# Patient Record
Sex: FEMALE | Race: OTHER | HISPANIC OR LATINO | URBAN - METROPOLITAN AREA
[De-identification: names, ages, dates, MRNs, and addresses within clinical notes are randomized per-mention and may not be internally consistent; named-entity substitution may affect disease eponyms.]

---

## 2017-04-17 ENCOUNTER — EMERGENCY (EMERGENCY)
Facility: HOSPITAL | Age: 57
LOS: 1 days | Discharge: HOME | End: 2017-04-17
Attending: EMERGENCY MEDICINE
Payer: COMMERCIAL

## 2017-04-17 VITALS
HEART RATE: 93 BPM | RESPIRATION RATE: 20 BRPM | TEMPERATURE: 98 F | HEIGHT: 62 IN | SYSTOLIC BLOOD PRESSURE: 128 MMHG | OXYGEN SATURATION: 95 % | WEIGHT: 205.03 LBS | DIASTOLIC BLOOD PRESSURE: 76 MMHG

## 2017-04-17 VITALS
RESPIRATION RATE: 20 BRPM | DIASTOLIC BLOOD PRESSURE: 76 MMHG | OXYGEN SATURATION: 98 % | SYSTOLIC BLOOD PRESSURE: 115 MMHG | HEART RATE: 85 BPM | TEMPERATURE: 98 F

## 2017-04-17 DIAGNOSIS — K57.92 DIVERTICULITIS OF INTESTINE, PART UNSPECIFIED, WITHOUT PERFORATION OR ABSCESS WITHOUT BLEEDING: ICD-10-CM

## 2017-04-17 LAB
ALBUMIN SERPL ELPH-MCNC: 3.2 G/DL — LOW (ref 3.5–5)
ALP SERPL-CCNC: 103 U/L — SIGNIFICANT CHANGE UP (ref 40–120)
ALT FLD-CCNC: 29 U/L DA — SIGNIFICANT CHANGE UP (ref 10–60)
ANION GAP SERPL CALC-SCNC: 7 MMOL/L — SIGNIFICANT CHANGE UP (ref 5–17)
APPEARANCE UR: CLEAR — SIGNIFICANT CHANGE UP
AST SERPL-CCNC: 15 U/L — SIGNIFICANT CHANGE UP (ref 10–40)
BASOPHILS # BLD AUTO: 0.2 K/UL — SIGNIFICANT CHANGE UP (ref 0–0.2)
BASOPHILS NFR BLD AUTO: 1.3 % — SIGNIFICANT CHANGE UP (ref 0–2)
BILIRUB SERPL-MCNC: 0.5 MG/DL — SIGNIFICANT CHANGE UP (ref 0.2–1.2)
BILIRUB UR-MCNC: NEGATIVE — SIGNIFICANT CHANGE UP
BUN SERPL-MCNC: 9 MG/DL — SIGNIFICANT CHANGE UP (ref 7–18)
CALCIUM SERPL-MCNC: 8.3 MG/DL — LOW (ref 8.4–10.5)
CHLORIDE SERPL-SCNC: 108 MMOL/L — SIGNIFICANT CHANGE UP (ref 96–108)
CO2 SERPL-SCNC: 30 MMOL/L — SIGNIFICANT CHANGE UP (ref 22–31)
COLOR SPEC: YELLOW — SIGNIFICANT CHANGE UP
CREAT SERPL-MCNC: 0.64 MG/DL — SIGNIFICANT CHANGE UP (ref 0.5–1.3)
DIFF PNL FLD: ABNORMAL
EOSINOPHIL # BLD AUTO: 0.6 K/UL — HIGH (ref 0–0.5)
EOSINOPHIL NFR BLD AUTO: 4.6 % — SIGNIFICANT CHANGE UP (ref 0–6)
GLUCOSE SERPL-MCNC: 115 MG/DL — HIGH (ref 70–99)
GLUCOSE UR QL: NEGATIVE — SIGNIFICANT CHANGE UP
HCG UR QL: NEGATIVE — SIGNIFICANT CHANGE UP
HCT VFR BLD CALC: 39.8 % — SIGNIFICANT CHANGE UP (ref 34.5–45)
HGB BLD-MCNC: 13.6 G/DL — SIGNIFICANT CHANGE UP (ref 11.5–15.5)
KETONES UR-MCNC: NEGATIVE — SIGNIFICANT CHANGE UP
LEUKOCYTE ESTERASE UR-ACNC: ABNORMAL
LIDOCAIN IGE QN: 80 U/L — SIGNIFICANT CHANGE UP (ref 73–393)
LYMPHOCYTES # BLD AUTO: 19.4 % — SIGNIFICANT CHANGE UP (ref 13–44)
LYMPHOCYTES # BLD AUTO: 2.4 K/UL — SIGNIFICANT CHANGE UP (ref 1–3.3)
MCHC RBC-ENTMCNC: 27.1 PG — SIGNIFICANT CHANGE UP (ref 27–34)
MCHC RBC-ENTMCNC: 34.1 GM/DL — SIGNIFICANT CHANGE UP (ref 32–36)
MCV RBC AUTO: 79.4 FL — LOW (ref 80–100)
MONOCYTES # BLD AUTO: 0.9 K/UL — SIGNIFICANT CHANGE UP (ref 0–0.9)
MONOCYTES NFR BLD AUTO: 7.1 % — SIGNIFICANT CHANGE UP (ref 2–14)
NEUTROPHILS # BLD AUTO: 8.2 K/UL — HIGH (ref 1.8–7.4)
NEUTROPHILS NFR BLD AUTO: 67.7 % — SIGNIFICANT CHANGE UP (ref 43–77)
NITRITE UR-MCNC: NEGATIVE — SIGNIFICANT CHANGE UP
PH UR: 6 — SIGNIFICANT CHANGE UP (ref 4.8–8)
PLATELET # BLD AUTO: 209 K/UL — SIGNIFICANT CHANGE UP (ref 150–400)
POTASSIUM SERPL-MCNC: 4 MMOL/L — SIGNIFICANT CHANGE UP (ref 3.5–5.3)
POTASSIUM SERPL-SCNC: 4 MMOL/L — SIGNIFICANT CHANGE UP (ref 3.5–5.3)
PROT SERPL-MCNC: 7.3 G/DL — SIGNIFICANT CHANGE UP (ref 6–8.3)
PROT UR-MCNC: NEGATIVE — SIGNIFICANT CHANGE UP
RBC # BLD: 5.01 M/UL — SIGNIFICANT CHANGE UP (ref 3.8–5.2)
RBC # FLD: 13.5 % — SIGNIFICANT CHANGE UP (ref 10.3–14.5)
SODIUM SERPL-SCNC: 145 MMOL/L — SIGNIFICANT CHANGE UP (ref 135–145)
SP GR SPEC: 1 — LOW (ref 1.01–1.02)
UROBILINOGEN FLD QL: NEGATIVE — SIGNIFICANT CHANGE UP
WBC # BLD: 12.2 K/UL — HIGH (ref 3.8–10.5)
WBC # FLD AUTO: 12.2 K/UL — HIGH (ref 3.8–10.5)

## 2017-04-17 PROCEDURE — 83690 ASSAY OF LIPASE: CPT

## 2017-04-17 PROCEDURE — 85027 COMPLETE CBC AUTOMATED: CPT

## 2017-04-17 PROCEDURE — 99284 EMERGENCY DEPT VISIT MOD MDM: CPT | Mod: 25

## 2017-04-17 PROCEDURE — 96374 THER/PROPH/DIAG INJ IV PUSH: CPT

## 2017-04-17 PROCEDURE — 80053 COMPREHEN METABOLIC PANEL: CPT

## 2017-04-17 PROCEDURE — 81025 URINE PREGNANCY TEST: CPT

## 2017-04-17 PROCEDURE — 81001 URINALYSIS AUTO W/SCOPE: CPT

## 2017-04-17 PROCEDURE — 74177 CT ABD & PELVIS W/CONTRAST: CPT | Mod: 26

## 2017-04-17 PROCEDURE — 99285 EMERGENCY DEPT VISIT HI MDM: CPT | Mod: 25

## 2017-04-17 PROCEDURE — 74177 CT ABD & PELVIS W/CONTRAST: CPT

## 2017-04-17 RX ORDER — ACETAMINOPHEN WITH CODEINE 300MG-30MG
1 TABLET ORAL
Qty: 20 | Refills: 0 | OUTPATIENT
Start: 2017-04-17 | End: 2017-04-22

## 2017-04-17 RX ORDER — CIPROFLOXACIN LACTATE 400MG/40ML
500 VIAL (ML) INTRAVENOUS ONCE
Qty: 0 | Refills: 0 | Status: DISCONTINUED | OUTPATIENT
Start: 2017-04-17 | End: 2017-04-21

## 2017-04-17 RX ORDER — METRONIDAZOLE 500 MG
500 TABLET ORAL ONCE
Qty: 0 | Refills: 0 | Status: DISCONTINUED | OUTPATIENT
Start: 2017-04-17 | End: 2017-04-21

## 2017-04-17 RX ORDER — MOXIFLOXACIN HYDROCHLORIDE TABLETS, 400 MG 400 MG/1
1 TABLET, FILM COATED ORAL
Qty: 20 | Refills: 0 | OUTPATIENT
Start: 2017-04-17 | End: 2017-04-27

## 2017-04-17 RX ORDER — METRONIDAZOLE 500 MG
1 TABLET ORAL
Qty: 30 | Refills: 0 | OUTPATIENT
Start: 2017-04-17 | End: 2017-04-27

## 2017-04-17 RX ORDER — MORPHINE SULFATE 50 MG/1
4 CAPSULE, EXTENDED RELEASE ORAL ONCE
Qty: 0 | Refills: 0 | Status: DISCONTINUED | OUTPATIENT
Start: 2017-04-17 | End: 2017-04-17

## 2017-04-17 RX ADMIN — MORPHINE SULFATE 4 MILLIGRAM(S): 50 CAPSULE, EXTENDED RELEASE ORAL at 08:55

## 2017-04-17 NOTE — ED PROVIDER NOTE - NS ED MD SCRIBE ATTENDING SCRIBE SECTIONS
REVIEW OF SYSTEMS/HISTORY OF PRESENT ILLNESS/PHYSICAL EXAM/HIV/DISPOSITION/VITAL SIGNS( Pullset)/PAST MEDICAL/SURGICAL/SOCIAL HISTORY

## 2017-04-17 NOTE — ED PROVIDER NOTE - MEDICAL DECISION MAKING DETAILS
57 y/o F pt with LLQ pain and constipation. On exam with tenderness to LLQ. Will check labs, CT to r/o diverticulitis, treat pain with morphine and reassess. 55 y/o F pt with LLQ pain and constipation. On exam with tenderness to LLQ. Will check labs, CT to r/o diverticulitis, treat pain with morphine and reassess. DC with oral antibiotics and reassess.

## 2017-04-17 NOTE — ED PROVIDER NOTE - OBJECTIVE STATEMENT
57 y/o F pt with PMHx of asthma, GERD and  x 2 presents to the ED c/o worsening abdominal pain x 3 days. Pt also notes mild dysuria. Pt reports feeling as if she has to pass a BM however she is unable to go. Pt is unable to pass gas. Pt denies nausea, vomiting, fever or any other complaints at this time. NKDA

## 2021-09-01 ENCOUNTER — EMERGENCY (EMERGENCY)
Facility: HOSPITAL | Age: 61
LOS: 1 days | Discharge: HOME | End: 2021-09-01
Payer: COMMERCIAL

## 2021-09-01 VITALS
HEART RATE: 78 BPM | DIASTOLIC BLOOD PRESSURE: 85 MMHG | SYSTOLIC BLOOD PRESSURE: 145 MMHG | OXYGEN SATURATION: 99 % | TEMPERATURE: 98 F | RESPIRATION RATE: 17 BRPM

## 2021-09-01 VITALS
SYSTOLIC BLOOD PRESSURE: 148 MMHG | HEART RATE: 93 BPM | WEIGHT: 194.01 LBS | OXYGEN SATURATION: 96 % | DIASTOLIC BLOOD PRESSURE: 91 MMHG | TEMPERATURE: 99 F | RESPIRATION RATE: 18 BRPM | HEIGHT: 61 IN

## 2021-09-01 LAB
ALBUMIN SERPL ELPH-MCNC: 4.2 G/DL — SIGNIFICANT CHANGE UP (ref 3.3–5)
ALP SERPL-CCNC: 102 U/L — SIGNIFICANT CHANGE UP (ref 40–120)
ALT FLD-CCNC: 10 U/L — SIGNIFICANT CHANGE UP (ref 10–45)
ANION GAP SERPL CALC-SCNC: 14 MMOL/L — SIGNIFICANT CHANGE UP (ref 5–17)
AST SERPL-CCNC: 14 U/L — SIGNIFICANT CHANGE UP (ref 10–40)
BASOPHILS # BLD AUTO: 0.08 K/UL — SIGNIFICANT CHANGE UP (ref 0–0.2)
BASOPHILS NFR BLD AUTO: 0.7 % — SIGNIFICANT CHANGE UP (ref 0–2)
BILIRUB SERPL-MCNC: 0.3 MG/DL — SIGNIFICANT CHANGE UP (ref 0.2–1.2)
BUN SERPL-MCNC: 12 MG/DL — SIGNIFICANT CHANGE UP (ref 7–23)
CALCIUM SERPL-MCNC: 9.6 MG/DL — SIGNIFICANT CHANGE UP (ref 8.4–10.5)
CHLORIDE SERPL-SCNC: 104 MMOL/L — SIGNIFICANT CHANGE UP (ref 96–108)
CO2 SERPL-SCNC: 25 MMOL/L — SIGNIFICANT CHANGE UP (ref 22–31)
CREAT SERPL-MCNC: 0.67 MG/DL — SIGNIFICANT CHANGE UP (ref 0.5–1.3)
EOSINOPHIL # BLD AUTO: 0.41 K/UL — SIGNIFICANT CHANGE UP (ref 0–0.5)
EOSINOPHIL NFR BLD AUTO: 3.5 % — SIGNIFICANT CHANGE UP (ref 0–6)
GAS PNL BLDV: SIGNIFICANT CHANGE UP
GLUCOSE SERPL-MCNC: 101 MG/DL — HIGH (ref 70–99)
HCT VFR BLD CALC: 44.4 % — SIGNIFICANT CHANGE UP (ref 34.5–45)
HGB BLD-MCNC: 14.3 G/DL — SIGNIFICANT CHANGE UP (ref 11.5–15.5)
IMM GRANULOCYTES NFR BLD AUTO: 0.5 % — SIGNIFICANT CHANGE UP (ref 0–1.5)
LYMPHOCYTES # BLD AUTO: 1.7 K/UL — SIGNIFICANT CHANGE UP (ref 1–3.3)
LYMPHOCYTES # BLD AUTO: 14.5 % — SIGNIFICANT CHANGE UP (ref 13–44)
MCHC RBC-ENTMCNC: 26.4 PG — LOW (ref 27–34)
MCHC RBC-ENTMCNC: 32.2 GM/DL — SIGNIFICANT CHANGE UP (ref 32–36)
MCV RBC AUTO: 82.1 FL — SIGNIFICANT CHANGE UP (ref 80–100)
MONOCYTES # BLD AUTO: 0.52 K/UL — SIGNIFICANT CHANGE UP (ref 0–0.9)
MONOCYTES NFR BLD AUTO: 4.4 % — SIGNIFICANT CHANGE UP (ref 2–14)
NEUTROPHILS # BLD AUTO: 8.99 K/UL — HIGH (ref 1.8–7.4)
NEUTROPHILS NFR BLD AUTO: 76.4 % — SIGNIFICANT CHANGE UP (ref 43–77)
NRBC # BLD: 0 /100 WBCS — SIGNIFICANT CHANGE UP (ref 0–0)
PLATELET # BLD AUTO: 245 K/UL — SIGNIFICANT CHANGE UP (ref 150–400)
POTASSIUM SERPL-MCNC: 4 MMOL/L — SIGNIFICANT CHANGE UP (ref 3.5–5.3)
POTASSIUM SERPL-SCNC: 4 MMOL/L — SIGNIFICANT CHANGE UP (ref 3.5–5.3)
PROT SERPL-MCNC: 7.7 G/DL — SIGNIFICANT CHANGE UP (ref 6–8.3)
RBC # BLD: 5.41 M/UL — HIGH (ref 3.8–5.2)
RBC # FLD: 14.8 % — HIGH (ref 10.3–14.5)
SODIUM SERPL-SCNC: 143 MMOL/L — SIGNIFICANT CHANGE UP (ref 135–145)
TROPONIN T, HIGH SENSITIVITY RESULT: <6 NG/L — SIGNIFICANT CHANGE UP (ref 0–51)
WBC # BLD: 11.76 K/UL — HIGH (ref 3.8–10.5)
WBC # FLD AUTO: 11.76 K/UL — HIGH (ref 3.8–10.5)

## 2021-09-01 PROCEDURE — 71046 X-RAY EXAM CHEST 2 VIEWS: CPT

## 2021-09-01 PROCEDURE — 84484 ASSAY OF TROPONIN QUANT: CPT

## 2021-09-01 PROCEDURE — 83605 ASSAY OF LACTIC ACID: CPT

## 2021-09-01 PROCEDURE — 99285 EMERGENCY DEPT VISIT HI MDM: CPT

## 2021-09-01 PROCEDURE — 84295 ASSAY OF SERUM SODIUM: CPT

## 2021-09-01 PROCEDURE — 82803 BLOOD GASES ANY COMBINATION: CPT

## 2021-09-01 PROCEDURE — 84443 ASSAY THYROID STIM HORMONE: CPT

## 2021-09-01 PROCEDURE — 82330 ASSAY OF CALCIUM: CPT

## 2021-09-01 PROCEDURE — 82947 ASSAY GLUCOSE BLOOD QUANT: CPT

## 2021-09-01 PROCEDURE — 82435 ASSAY OF BLOOD CHLORIDE: CPT

## 2021-09-01 PROCEDURE — 80053 COMPREHEN METABOLIC PANEL: CPT

## 2021-09-01 PROCEDURE — 71046 X-RAY EXAM CHEST 2 VIEWS: CPT | Mod: 26

## 2021-09-01 PROCEDURE — 93005 ELECTROCARDIOGRAM TRACING: CPT

## 2021-09-01 PROCEDURE — 85018 HEMOGLOBIN: CPT

## 2021-09-01 PROCEDURE — 93010 ELECTROCARDIOGRAM REPORT: CPT

## 2021-09-01 PROCEDURE — 84132 ASSAY OF SERUM POTASSIUM: CPT

## 2021-09-01 PROCEDURE — 85025 COMPLETE CBC W/AUTO DIFF WBC: CPT

## 2021-09-01 PROCEDURE — 85014 HEMATOCRIT: CPT

## 2021-09-01 PROCEDURE — 99284 EMERGENCY DEPT VISIT MOD MDM: CPT | Mod: 25

## 2021-09-01 NOTE — ED PROVIDER NOTE - OBJECTIVE STATEMENT
62yo F pmhx Asthma, parkinsons presents to ER sent by her PCP for elevated potassium.  Patient reports over past year started having palpitations at time associated with shortness of breath- worse at night.  When symptoms first started her PCP sent her to a cardiologist -per patient had echo and holter monitor which her normal was started on bystolic for symptomatic control.  Reports palpitations continued and worsened over the past month.  Saw her PCP had blood work monday, was called today and advised to go to ER for elevated potassium.

## 2021-09-01 NOTE — ED PROVIDER NOTE - ATTENDING CONTRIBUTION TO CARE
MD Morrow:  patient seen and evaluated with the PA.  I was present for key portions of the History and Physical, and I agree with the Impression and Plan.    Patient is a 61F, sent to ED for abnormal outpatient labs (hyperK)  Context:  chronic palpitations for > 1yr, was being seen by PMD for re-evaluation of this problem.  (has had prior holter monitor as well).  Labs were sent, and results came back abnormal - elevated potassium.   Duration:  > 1yrs.   Associated Sx:  No CP/SOB, no palpitations, back pain.  No weakness/numbness, no abdominal pain, & no fever/chills.    VS: wnl.  Physical Exam: adult F, NAD, NCAT, PERRL, EOMI, neck supple, CTA B, RRR, Abd: s/nd/nt, Ext: no edema.  Neuro:  AAOx3, moving all 4 extremities equally, normal gait.     ECG:  NSR, normal intervals.  no ST-elev/depr.  ECG - unremarkable.   Impression:  Palpitations of unclear etiology, without ECG evidence of electrolyte abnormality.   Plan:  repeat labs, CXR, reassess.

## 2021-09-01 NOTE — ED PROVIDER NOTE - CLINICAL SUMMARY MEDICAL DECISION MAKING FREE TEXT BOX
Impression:  Palpitations of unclear etiology, without ECG evidence of electrolyte abnormality.   Plan:  repeat labs, CXR, reassess.

## 2021-09-01 NOTE — ED PROVIDER NOTE - NSFOLLOWUPINSTRUCTIONS_ED_ALL_ED_FT
You were evaluated for abnormal potassium level.  You potassium was normal at 4.0.  You had negative a cardiac enzyme.     Please follow up with your primary care doctor, discuss results.    Follow up with your cardiologist     No signs of emergency medical condition on today's workup.  Presumptive diagnosis made, but further evaluation may be required by your primary care doctor or specialist for a definitive diagnosis.  Therefore, follow up as directed and if symptoms change/worsen or any emergency conditions, please return to the ER.

## 2021-09-01 NOTE — ED PROVIDER NOTE - PATIENT PORTAL LINK FT
You can access the FollowMyHealth Patient Portal offered by [facility] by registering at the following website: [email or web address]. By joining [employer]’s FollowMyHealth portal, you will also be able to view your health information using other applications (apps) compatible with our system.

## 2021-09-01 NOTE — ED ADULT NURSE NOTE - OBJECTIVE STATEMENT
61 year old female PMH of parkinson's recently diagnosed and started on medication, Asthma, presents to ER sent by her PCP for elevated potassium on routine labs drawn at outpatient office on Monday.  Patient reports over past year started having palpitations at time associated with shortness of breath- worse at night.  When symptoms first started her PCP sent her to a cardiologist and had cardiac workup including echo with normal findings. 20g peripheral IV placed in L AC and labs drawn and sent to lab. EKG done in triage and given to ED MD Morrow. Patient placed on CM - NSR in 80s. Patient undressed and placed into gown, call bell in hand and side rails up with bed in lowest position for safety. blanket provided. Comfort and safety provided.

## 2021-09-02 LAB — TSH SERPL-MCNC: 2.2 UIU/ML — SIGNIFICANT CHANGE UP (ref 0.27–4.2)

## 2022-11-17 NOTE — ED ADULT NURSE NOTE - FINAL NURSING ELECTRONIC SIGNATURE
Location of patient: [place]    Subjective: Caller states \"I was in the hospital for a torn muscle on the 11th, they also did a colonoscopy and upper GI. I am now really bloated\"     Current Symptoms: whole stomach is bloated- feels hard- had a BM 2 days ago    Onset: 1 week ago;     Pain Severity: denies    Temperature: denies     What has been tried: NA    Recommended disposition: See in Office Today    Care advice provided, patient verbalizes understanding; denies any other questions or concerns; instructed to call back for any new or worsening symptoms. Patient/caller agrees to follow-up with PCP     This triage is a result of a call to [address] [place] [address]. Please do not respond to the triage nurse through this encounter. Any subsequent communication should be directly with the patient.       Reason for Disposition   Age > 60 years    Protocols used: Abdominal Pain - Female-ADULT-[place] 17-Apr-2017 09:07 17-Apr-2017 11:48

## 2023-02-22 ENCOUNTER — EMERGENCY (EMERGENCY)
Facility: HOSPITAL | Age: 63
LOS: 1 days | Discharge: HOME | End: 2023-02-22
Attending: EMERGENCY MEDICINE | Admitting: EMERGENCY MEDICINE
Payer: COMMERCIAL

## 2023-02-22 VITALS
HEART RATE: 70 BPM | TEMPERATURE: 99 F | RESPIRATION RATE: 18 BRPM | OXYGEN SATURATION: 99 % | DIASTOLIC BLOOD PRESSURE: 94 MMHG | SYSTOLIC BLOOD PRESSURE: 156 MMHG

## 2023-02-22 VITALS
SYSTOLIC BLOOD PRESSURE: 153 MMHG | HEART RATE: 83 BPM | DIASTOLIC BLOOD PRESSURE: 98 MMHG | TEMPERATURE: 98 F | RESPIRATION RATE: 18 BRPM | OXYGEN SATURATION: 97 %

## 2023-02-22 DIAGNOSIS — Z90.49 ACQUIRED ABSENCE OF OTHER SPECIFIED PARTS OF DIGESTIVE TRACT: Chronic | ICD-10-CM

## 2023-02-22 DIAGNOSIS — Z98.891 HISTORY OF UTERINE SCAR FROM PREVIOUS SURGERY: Chronic | ICD-10-CM

## 2023-02-22 PROBLEM — J45.909 UNSPECIFIED ASTHMA, UNCOMPLICATED: Chronic | Status: ACTIVE | Noted: 2021-09-01

## 2023-02-22 LAB
ALBUMIN SERPL ELPH-MCNC: 3.2 G/DL — LOW (ref 3.3–5)
ALP SERPL-CCNC: 89 U/L — SIGNIFICANT CHANGE UP (ref 40–120)
ALT FLD-CCNC: <5 U/L — LOW (ref 4–33)
ANION GAP SERPL CALC-SCNC: 11 MMOL/L — SIGNIFICANT CHANGE UP (ref 7–14)
ANISOCYTOSIS BLD QL: SLIGHT — SIGNIFICANT CHANGE UP
APPEARANCE UR: CLEAR — SIGNIFICANT CHANGE UP
AST SERPL-CCNC: 24 U/L — SIGNIFICANT CHANGE UP (ref 4–32)
BASE EXCESS BLDV CALC-SCNC: 4.7 MMOL/L — HIGH (ref -2–3)
BASOPHILS # BLD AUTO: 0 K/UL — SIGNIFICANT CHANGE UP (ref 0–0.2)
BASOPHILS NFR BLD AUTO: 0 % — SIGNIFICANT CHANGE UP (ref 0–2)
BILIRUB SERPL-MCNC: 0.4 MG/DL — SIGNIFICANT CHANGE UP (ref 0.2–1.2)
BILIRUB UR-MCNC: NEGATIVE — SIGNIFICANT CHANGE UP
BLOOD GAS VENOUS COMPREHENSIVE RESULT: SIGNIFICANT CHANGE UP
BUN SERPL-MCNC: 6 MG/DL — LOW (ref 7–23)
CALCIUM SERPL-MCNC: 8.7 MG/DL — SIGNIFICANT CHANGE UP (ref 8.4–10.5)
CHLORIDE BLDV-SCNC: 104 MMOL/L — SIGNIFICANT CHANGE UP (ref 96–108)
CHLORIDE SERPL-SCNC: 104 MMOL/L — SIGNIFICANT CHANGE UP (ref 98–107)
CO2 BLDV-SCNC: 31.9 MMOL/L — HIGH (ref 22–26)
CO2 SERPL-SCNC: 22 MMOL/L — SIGNIFICANT CHANGE UP (ref 22–31)
COLOR SPEC: YELLOW — SIGNIFICANT CHANGE UP
CREAT SERPL-MCNC: 0.43 MG/DL — LOW (ref 0.5–1.3)
DIFF PNL FLD: NEGATIVE — SIGNIFICANT CHANGE UP
EGFR: 111 ML/MIN/1.73M2 — SIGNIFICANT CHANGE UP
EOSINOPHIL # BLD AUTO: 0.54 K/UL — HIGH (ref 0–0.5)
EOSINOPHIL NFR BLD AUTO: 6.7 % — HIGH (ref 0–6)
FLUAV AG NPH QL: SIGNIFICANT CHANGE UP
FLUBV AG NPH QL: SIGNIFICANT CHANGE UP
GAS PNL BLDV: 129 MMOL/L — LOW (ref 136–145)
GAS PNL BLDV: SIGNIFICANT CHANGE UP
GIANT PLATELETS BLD QL SMEAR: PRESENT — SIGNIFICANT CHANGE UP
GLUCOSE BLDV-MCNC: 104 MG/DL — HIGH (ref 70–99)
GLUCOSE SERPL-MCNC: 105 MG/DL — HIGH (ref 70–99)
GLUCOSE UR QL: NEGATIVE — SIGNIFICANT CHANGE UP
HCO3 BLDV-SCNC: 30 MMOL/L — HIGH (ref 22–29)
HCT VFR BLD CALC: 41 % — SIGNIFICANT CHANGE UP (ref 34.5–45)
HCT VFR BLDA CALC: 42 % — SIGNIFICANT CHANGE UP (ref 34.5–46.5)
HEMOLYSIS INDEX: 1 — SIGNIFICANT CHANGE UP
HGB BLD CALC-MCNC: 13.9 G/DL — SIGNIFICANT CHANGE UP (ref 11.7–16.1)
HGB BLD-MCNC: 12.7 G/DL — SIGNIFICANT CHANGE UP (ref 11.5–15.5)
HYPOCHROMIA BLD QL: SLIGHT — SIGNIFICANT CHANGE UP
IANC: 4.94 K/UL — SIGNIFICANT CHANGE UP (ref 1.8–7.4)
KETONES UR-MCNC: NEGATIVE — SIGNIFICANT CHANGE UP
LACTATE BLDV-MCNC: 1.1 MMOL/L — SIGNIFICANT CHANGE UP (ref 0.5–2)
LEUKOCYTE ESTERASE UR-ACNC: NEGATIVE — SIGNIFICANT CHANGE UP
LIDOCAIN IGE QN: 17 U/L — SIGNIFICANT CHANGE UP (ref 7–60)
LYMPHOCYTES # BLD AUTO: 1.34 K/UL — SIGNIFICANT CHANGE UP (ref 1–3.3)
LYMPHOCYTES # BLD AUTO: 16.8 % — SIGNIFICANT CHANGE UP (ref 13–44)
MCHC RBC-ENTMCNC: 25.7 PG — LOW (ref 27–34)
MCHC RBC-ENTMCNC: 31 GM/DL — LOW (ref 32–36)
MCV RBC AUTO: 83 FL — SIGNIFICANT CHANGE UP (ref 80–100)
MICROCYTES BLD QL: SLIGHT — SIGNIFICANT CHANGE UP
MONOCYTES # BLD AUTO: 0.2 K/UL — SIGNIFICANT CHANGE UP (ref 0–0.9)
MONOCYTES NFR BLD AUTO: 2.5 % — SIGNIFICANT CHANGE UP (ref 2–14)
NEUTROPHILS # BLD AUTO: 5.78 K/UL — SIGNIFICANT CHANGE UP (ref 1.8–7.4)
NEUTROPHILS NFR BLD AUTO: 72.3 % — SIGNIFICANT CHANGE UP (ref 43–77)
NITRITE UR-MCNC: NEGATIVE — SIGNIFICANT CHANGE UP
PCO2 BLDV: 48 MMHG — SIGNIFICANT CHANGE UP (ref 39–52)
PH BLDV: 7.41 — SIGNIFICANT CHANGE UP (ref 7.32–7.43)
PH UR: 7 — SIGNIFICANT CHANGE UP (ref 5–8)
PLAT MORPH BLD: ABNORMAL
PLATELET # BLD AUTO: 278 K/UL — SIGNIFICANT CHANGE UP (ref 150–400)
PLATELET COUNT - ESTIMATE: NORMAL — SIGNIFICANT CHANGE UP
PO2 BLDV: 38 MMHG — SIGNIFICANT CHANGE UP (ref 25–45)
POIKILOCYTOSIS BLD QL AUTO: SLIGHT — SIGNIFICANT CHANGE UP
POLYCHROMASIA BLD QL SMEAR: SLIGHT — SIGNIFICANT CHANGE UP
POTASSIUM BLDV-SCNC: >11 MMOL/L — CRITICAL HIGH (ref 3.5–5.1)
POTASSIUM SERPL-MCNC: 3.6 MMOL/L — SIGNIFICANT CHANGE UP (ref 3.5–5.3)
POTASSIUM SERPL-MCNC: 5.9 MMOL/L — HIGH (ref 3.5–5.3)
POTASSIUM SERPL-SCNC: 3.6 MMOL/L — SIGNIFICANT CHANGE UP (ref 3.5–5.3)
POTASSIUM SERPL-SCNC: 5.9 MMOL/L — HIGH (ref 3.5–5.3)
PROT SERPL-MCNC: 7.4 G/DL — SIGNIFICANT CHANGE UP (ref 6–8.3)
PROT UR-MCNC: ABNORMAL
RBC # BLD: 4.94 M/UL — SIGNIFICANT CHANGE UP (ref 3.8–5.2)
RBC # FLD: 14.6 % — HIGH (ref 10.3–14.5)
RBC BLD AUTO: ABNORMAL
RSV RNA NPH QL NAA+NON-PROBE: SIGNIFICANT CHANGE UP
SAO2 % BLDV: 71.1 % — SIGNIFICANT CHANGE UP (ref 67–88)
SARS-COV-2 RNA SPEC QL NAA+PROBE: SIGNIFICANT CHANGE UP
SODIUM SERPL-SCNC: 137 MMOL/L — SIGNIFICANT CHANGE UP (ref 135–145)
SP GR SPEC: >1.05 (ref 1.01–1.05)
UROBILINOGEN FLD QL: SIGNIFICANT CHANGE UP
VARIANT LYMPHS # BLD: 1.7 % — SIGNIFICANT CHANGE UP (ref 0–6)
WBC # BLD: 7.99 K/UL — SIGNIFICANT CHANGE UP (ref 3.8–10.5)
WBC # FLD AUTO: 7.99 K/UL — SIGNIFICANT CHANGE UP (ref 3.8–10.5)

## 2023-02-22 PROCEDURE — 71046 X-RAY EXAM CHEST 2 VIEWS: CPT | Mod: 26

## 2023-02-22 PROCEDURE — 99285 EMERGENCY DEPT VISIT HI MDM: CPT

## 2023-02-22 PROCEDURE — 74019 RADEX ABDOMEN 2 VIEWS: CPT | Mod: 26

## 2023-02-22 PROCEDURE — 74177 CT ABD & PELVIS W/CONTRAST: CPT | Mod: 26,MA

## 2023-02-22 RX ORDER — FAMOTIDINE 10 MG/ML
20 INJECTION INTRAVENOUS ONCE
Refills: 0 | Status: COMPLETED | OUTPATIENT
Start: 2023-02-22 | End: 2023-02-22

## 2023-02-22 RX ORDER — METRONIDAZOLE 500 MG
1 TABLET ORAL
Qty: 30 | Refills: 0
Start: 2023-02-22 | End: 2023-03-03

## 2023-02-22 RX ORDER — IPRATROPIUM/ALBUTEROL SULFATE 18-103MCG
3 AEROSOL WITH ADAPTER (GRAM) INHALATION ONCE
Refills: 0 | Status: COMPLETED | OUTPATIENT
Start: 2023-02-22 | End: 2023-02-22

## 2023-02-22 RX ORDER — ACETAMINOPHEN 500 MG
1000 TABLET ORAL ONCE
Refills: 0 | Status: COMPLETED | OUTPATIENT
Start: 2023-02-22 | End: 2023-02-22

## 2023-02-22 RX ORDER — METRONIDAZOLE 500 MG
500 TABLET ORAL ONCE
Refills: 0 | Status: COMPLETED | OUTPATIENT
Start: 2023-02-22 | End: 2023-02-22

## 2023-02-22 RX ORDER — CIPROFLOXACIN LACTATE 400MG/40ML
400 VIAL (ML) INTRAVENOUS ONCE
Refills: 0 | Status: COMPLETED | OUTPATIENT
Start: 2023-02-22 | End: 2023-02-22

## 2023-02-22 RX ORDER — KETOROLAC TROMETHAMINE 30 MG/ML
15 SYRINGE (ML) INJECTION ONCE
Refills: 0 | Status: DISCONTINUED | OUTPATIENT
Start: 2023-02-22 | End: 2023-02-22

## 2023-02-22 RX ORDER — SODIUM CHLORIDE 9 MG/ML
1000 INJECTION INTRAMUSCULAR; INTRAVENOUS; SUBCUTANEOUS ONCE
Refills: 0 | Status: COMPLETED | OUTPATIENT
Start: 2023-02-22 | End: 2023-02-22

## 2023-02-22 RX ORDER — CIPROFLOXACIN LACTATE 400MG/40ML
1 VIAL (ML) INTRAVENOUS
Qty: 20 | Refills: 0
Start: 2023-02-22 | End: 2023-03-03

## 2023-02-22 RX ADMIN — Medication 100 MILLIGRAM(S): at 15:08

## 2023-02-22 RX ADMIN — Medication 400 MILLIGRAM(S): at 09:14

## 2023-02-22 RX ADMIN — Medication 200 MILLIGRAM(S): at 13:53

## 2023-02-22 RX ADMIN — SODIUM CHLORIDE 1000 MILLILITER(S): 9 INJECTION INTRAMUSCULAR; INTRAVENOUS; SUBCUTANEOUS at 09:14

## 2023-02-22 RX ADMIN — Medication 30 MILLILITER(S): at 09:14

## 2023-02-22 RX ADMIN — Medication 15 MILLIGRAM(S): at 14:22

## 2023-02-22 RX ADMIN — FAMOTIDINE 20 MILLIGRAM(S): 10 INJECTION INTRAVENOUS at 09:14

## 2023-02-22 RX ADMIN — Medication 3 MILLILITER(S): at 10:56

## 2023-02-22 RX ADMIN — Medication 1000 MILLIGRAM(S): at 09:44

## 2023-02-22 RX ADMIN — Medication 15 MILLIGRAM(S): at 13:52

## 2023-02-22 NOTE — ED ADULT TRIAGE NOTE - CHIEF COMPLAINT QUOTE
Pt is c/o left sided abdominal pain which is radiating to the left flank since last week. Last BM 4 days ago. Pt says she feels bloated, and unable to pass gas. PSH of cholecystectomy, c- section. Unable to eat food as the pain comes on after she eats.

## 2023-02-22 NOTE — ED ADULT NURSE NOTE - OBJECTIVE STATEMENT
pt A&ox4, awake and alert, coming to ED for epigastric pain and LLQ pain that started this morning. pt states shes had this pain before but it "goes away".  pmh: Asthma, parkinson's. pt denies Chest pain and SOB. pt denies H/A, Dizziness, lightheadedness, and radiating chest pain. breathing is spontaneous and unlabored. abdomen is soft, round, and tender in epigastric region and LLQ. pt denies n/v/d, fevers or chills. states last bowel movement was 3 days ago along with flatulents. sating 99% on RA. bilateral pedal and radial pulses palpable and strong. left ac 20g IV placed Labs drawn and sent as per ordered. Bed in lowest position, call bell within reach, all other safety and comfort measures provided. awaiting labs results and CT.

## 2023-02-22 NOTE — ED PROVIDER NOTE - CLINICAL SUMMARY MEDICAL DECISION MAKING FREE TEXT BOX
62-year-old female with past medical history of Parkinson's, HTN, asthma presents to the ER complaining of approximately 10 days of abdominal pain, constant on the left side of the abdomen.  Patient unable to have bowel movement for 3 to 4 days.  Has not passed gas until this morning slightly.  Reports nausea but no vomiting.  No fevers.  No dysuria or hematuria, however described that due to pressure in abdomen she feels suprapubic discomfort with urination.  Patient also feels SOB 2/2 to her asthma   labs, ekg, cxr, abd xray,  ct a/p,  pain meds, reassess.

## 2023-02-22 NOTE — ED PROVIDER NOTE - PROGRESS NOTE DETAILS
Patient, updated on results.  Antibiotics ordered.  Patient offered and declined CDU.  Ending UA and likely discharge UA without evidence of infection. patient for dc

## 2023-02-22 NOTE — ED ADULT NURSE REASSESSMENT NOTE - NS ED NURSE REASSESS COMMENT FT1
pt A&ox4, awake and alert. Patient is being discharged today.  Education provided via teach back method and written materials to patient and, verbalize understanding. Medication reconciliation completed. All personal belongings with patient. All tele leads and IV removed from pt as per provider order. No complaints/signs/symptoms of pain, distress, or discomfort at this time.
Float RN: pt a&ox4, resting comfortably in stretcher c/o moderate abdominal pain; pt medicated as per MAR. Pt denies cp, sob, n/v, fever/chills. Breathing even, unlabored. Fall precautions in place.

## 2023-02-22 NOTE — ED PROVIDER NOTE - ATTENDING APP SHARED VISIT CONTRIBUTION OF CARE
Attending Statement: I have reviewed and agree with all pertinent clinical information, including history and physical exam and agree with treatment plan of the PA, except as noted.  62-year-old female history of hypertension, Parkinson's, asthma presents with abdominal pain.  Pain is located on the left lower quadrant since been constant for the last 10 days.  Has had similar symptoms in the past but usually resolves after using the bathroom after having a bowel movement.  Patient feels "bloated" and states she is passing less gas than normal.  Denies any nausea or vomiting.  No urinary complaints no fever no chills no chest pain.  But states she feels "short of breath when I get very bloated."  No recent travel.  Has a prior  and cholecystectomy.  States she has had 2 prior colonoscopies last one 2 years ago but states that "my colon was not clean" colonoscopies were nondiagnostic.  Vital signs noted nontoxic female.  Nonicteric no jaundice.  Mild expiratory wheezing but no work of breathing not requiring oxygen.  Soft nondistended abdomen mild tender in the left lower quadrant.  Plan labs, UA, CT abdomen pelvis rule out diverticulitis/colitis pain meds, DuoNeb, chest x-ray and EKG and reassess

## 2023-02-22 NOTE — ED PROVIDER NOTE - OBJECTIVE STATEMENT
62-year-old female with past medical history of Parkinson's, HTN, asthma presents to the ER complaining of approximately 10 days of abdominal pain, constant on the left side of the abdomen.  Patient unable to have bowel movement for 3 to 4 days.  Has not passed gas until this morning slightly.  Reports nausea but no vomiting.  No fevers.  No dysuria or hematuria, however described that due to pressure in abdomen she feels suprapubic discomfort with urination.  Patient also feels SOB 2/2 to her asthma   PSHX , and cholecystectomy.

## 2023-02-22 NOTE — ED PROVIDER NOTE - PHYSICAL EXAMINATION
Vital signs reviewed.   CONSTITUTIONAL: Well-appearing; well-nourished; in no apparent distress. Non-toxic appearing.   HEAD: Normocephalic, atraumatic.  EYES: PERRL, EOM intact, conjunctiva and sclera WNL.  CARD: Normal S1, S2; no murmurs, rubs, or gallops noted.  RESP: Normal chest excursion with respiration; breath sounds clear and equal bilaterally; + exp wheezes diffusely , rhonchi, or rales.  ABD/GI: soft, non-distended; +Tender left abd; no palpable organomegaly, no pulsatile mass.  EXT/MS: moves all extremities; distal pulses are normal, no pedal edema.  SKIN: Normal for age and race; warm; dry; good turgor; no apparent lesions or exudate noted.  NEURO: Awake, alert, oriented x 3, no gross deficits, CN II-XII grossly intact, no motor or sensory deficit noted.  PSYCH: Normal mood; appropriate affect.

## 2023-02-22 NOTE — ED PROVIDER NOTE - PATIENT PORTAL LINK FT
You can access the FollowMyHealth Patient Portal offered by [facility] by registering at the following website: [email or web address]. By joining [employer]’s FollowMyHealth portal, you will also be able to view your health information using other applications (apps) compatible with our system.

## 2023-02-22 NOTE — ED PROVIDER NOTE - NSFOLLOWUPINSTRUCTIONS_ED_ALL_ED_FT
Follow up with your Doctor in 1-2 days.  Bring a copy of results   Follow up with Gastroenterology, please expect a call for assistance with follow up   Rest. Drink plenty of fluids.  Slowly advance diet.  Take Ciprofloxacin 500mg orally 2 x a day x 10 days.  Take Metronidazole 500mg orally 3 x a days x 10 days.  Return to the ER for persistent/worsening or new symptoms fevers, chills, unable to eat/drink, weakness, dizziness, nausea, vomiting, or any concerning symptoms.      Diverticulitis       Diverticulitis is when small pouches in your colon (large intestine) get infected or swollen. This causes pain in the belly (abdomen) and watery poop (diarrhea).    These pouches are called diverticula. The pouches form in people who have a condition called diverticulosis.      What are the causes?    This condition may be caused by poop (stool) that gets trapped in the pouches in your colon. The poop lets germs (bacteria) grow in the pouches. This causes the infection.      What increases the risk?    You are more likely to get this condition if you have small pouches in your colon. The risk is higher if:  •You are overweight or very overweight (obese).      •You do not exercise enough.      •You drink alcohol.      •You smoke or use products with tobacco in them.      •You eat a diet that has a lot of red meat such as beef, pork, or lamb.      •You eat a diet that does not have enough fiber in it.      •You are older than 40 years of age.        What are the signs or symptoms?    •Pain in the belly. Pain is often on the left side, but it may be in other areas.      •Fever and feeling cold.      •Feeling like you may vomit.      •Vomiting.      •Having cramps.      •Feeling full.      •Changes to how often you poop.      •Blood in your poop.        How is this treated?    Most cases are treated at home by:  •Taking over-the-counter pain medicines.      •Following a clear liquid diet.      •Taking antibiotic medicines.      •Resting.      Very bad cases may need to be treated at a hospital. This may include:  •Not eating or drinking.      •Taking prescription pain medicine.      •Getting antibiotic medicines through an IV tube.      •Getting fluid and food through an IV tube.      •Having surgery.      When you are feeling better, your doctor may tell you to have a test to check your colon (colonoscopy).      Follow these instructions at home:    Medicines   •Take over-the-counter and prescription medicines only as told by your doctor. These include:  •Antibiotics.      •Pain medicines.      •Fiber pills.      •Probiotics.      •Stool softeners.        •If you were prescribed an antibiotic medicine, take it as told by your doctor. Do not stop taking the antibiotic even if you start to feel better.      •Ask your doctor if the medicine prescribed to you requires you to avoid driving or using machinery.        Eating and drinking      •Follow a diet as told by your doctor.    •When you feel better, your doctor may tell you to change your diet. You may need to eat a lot of fiber. Fiber makes it easier to poop (have a bowel movement). Foods with fiber include:  •Berries.      •Beans.      •Lentils.      •Green vegetables.        •Avoid eating red meat.      General instructions     • Do not use any products that contain nicotine or tobacco, such as cigarettes, e-cigarettes, and chewing tobacco. If you need help quitting, ask your doctor.      •Exercise 3 or more times a week. Try to get 30 minutes each time. Exercise enough to sweat and make your heart beat faster.      •Keep all follow-up visits as told by your doctor. This is important.        Contact a doctor if:    •Your pain does not get better.      •You are not pooping like normal.        Get help right away if:    •Your pain gets worse.      •Your symptoms do not get better.      •Your symptoms get worse very fast.      •You have a fever.      •You vomit more than one time.    •You have poop that is:  •Bloody.      •Black.      •Tarry.          Summary    •This condition happens when small pouches in your colon get infected or swollen.      •Take medicines only as told by your doctor.      •Follow a diet as told by your doctor.      •Keep all follow-up visits as told by your doctor. This is important.      This information is not intended to replace advice given to you by your health care provider. Make sure you discuss any questions you have with your health care provider.

## 2023-02-24 LAB
CULTURE RESULTS: SIGNIFICANT CHANGE UP
SPECIMEN SOURCE: SIGNIFICANT CHANGE UP

## 2023-02-28 PROBLEM — G20 PARKINSON'S DISEASE: Chronic | Status: ACTIVE | Noted: 2023-02-22

## 2023-02-28 PROBLEM — I10 ESSENTIAL (PRIMARY) HYPERTENSION: Chronic | Status: ACTIVE | Noted: 2023-02-22

## 2023-03-23 ENCOUNTER — APPOINTMENT (OUTPATIENT)
Dept: GASTROENTEROLOGY | Facility: CLINIC | Age: 63
End: 2023-03-23
Payer: COMMERCIAL

## 2023-03-23 DIAGNOSIS — K59.09 OTHER CONSTIPATION: ICD-10-CM

## 2023-03-23 DIAGNOSIS — K57.32 DIVERTICULITIS OF LARGE INTESTINE W/OUT PERFORATION OR ABSCESS W/OUT BLEEDING: ICD-10-CM

## 2023-03-23 PROBLEM — Z00.00 ENCOUNTER FOR PREVENTIVE HEALTH EXAMINATION: Status: ACTIVE | Noted: 2023-03-23

## 2023-03-23 PROCEDURE — 99204 OFFICE O/P NEW MOD 45 MIN: CPT

## 2023-03-23 RX ORDER — LINACLOTIDE 145 UG/1
145 CAPSULE, GELATIN COATED ORAL
Qty: 30 | Refills: 3 | Status: ACTIVE | COMMUNITY
Start: 2023-03-23 | End: 1900-01-01

## 2023-03-23 RX ORDER — POLYETHYLENE GLYCOL 3350 AND ELECTROLYTES WITH LEMON FLAVOR 236; 22.74; 6.74; 5.86; 2.97 G/4L; G/4L; G/4L; G/4L; G/4L
236 POWDER, FOR SOLUTION ORAL
Qty: 1 | Refills: 0 | Status: ACTIVE | COMMUNITY
Start: 2023-03-23 | End: 1900-01-01

## 2023-03-23 NOTE — ASSESSMENT
[FreeTextEntry1] : The symptoms of IBS-C include abdominal pain and discomfort, along with changes in bowel function.  Bloating and/or gas also may happen. Changes in bowel function may include straining, infrequent stools, hard or lumpy stools, and/or a feeling that the bowel does not empty completely. Some people may feel as if there is a “blockage” preventing them from passing stools. They may need to press on a part of their body or change body position to help them complete their bowel movement. How often a person passes stool, or the way it appears, may be different when abdominal discomfort is happening. With IBS-C, abdominal discomfort often improves after a bowel movement. In most cases, symptoms are ongoing (chronic), but they may come and go.\par \par \par The cause of IBS-C is not known. Some experts think that it relates to changes in how the intestines move and contract, or changes in how the gut senses pain. In some patients, IBS-C may happen after a past infection in the gut. It could also be related to changes in the messages between the brain and the intestines. There is evidence that bacteria which are normally found in the gut, or changes to the composition of those bacteria, play a role. In addition, researchers are looking into possible roles of genetics and/or changes in the immune system.\par \par We discussed this at length.\par \par  The risks benefits alternatives and complications of the procedure/s were explained to the patient at length. The patient was agreeable and we will proceed.\par \par Colon \par \par Linzess\par \par I spent 45 minutes reviewing the patients records prior to arrival, with patient , and reviewing records after visit. All prior testing reviewed at length. All questions were answered.\par \par

## 2023-03-23 NOTE — HISTORY OF PRESENT ILLNESS
[FreeTextEntry1] : Recent Ditis Tx 2/23\par \par Long Constipation - No Tx \par \par Colon last Dr Miguel poor prep \par \par No alarm sx

## 2023-03-23 NOTE — PHYSICAL EXAM
[Alert] : alert [Normal Voice/Communication] : normal voice/communication [Healthy Appearing] : healthy appearing [No Acute Distress] : no acute distress [Sclera] : the sclera and conjunctiva were normal [Hearing Threshold Finger Rub Not Travis] : hearing was normal [Oropharynx] : the oropharynx was normal [Normal Lips/Gums] : the lips and gums were normal [Normal Appearance] : the appearance of the neck was normal [No Neck Mass] : no neck mass was observed [No Respiratory Distress] : no respiratory distress [No Acc Muscle Use] : no accessory muscle use [Respiration, Rhythm And Depth] : normal respiratory rhythm and effort [Auscultation Breath Sounds / Voice Sounds] : lungs were clear to auscultation bilaterally [Heart Rate And Rhythm] : heart rate was normal and rhythm regular [Normal S1, S2] : normal S1 and S2 [Murmurs] : no murmurs [Bowel Sounds] : normal bowel sounds [Abdomen Tenderness] : non-tender [No Masses] : no abdominal mass palpated [Abdomen Soft] : soft [] : no hepatosplenomegaly [Oriented To Time, Place, And Person] : oriented to person, place, and time

## 2023-06-19 ENCOUNTER — APPOINTMENT (OUTPATIENT)
Dept: GASTROENTEROLOGY | Facility: AMBULATORY MEDICAL SERVICES | Age: 63
End: 2023-06-19
Payer: COMMERCIAL

## 2023-06-19 PROCEDURE — 45378 DIAGNOSTIC COLONOSCOPY: CPT

## 2024-01-11 NOTE — ED ADULT NURSE NOTE - NS ED NOTE ABUSE RESPONSE YN
----- Message from LAILA Reddy sent at 1/10/2024 12:05 PM CST -----  A1C in prediabetic range, work on lower carb diet. Recheck in 6 months  Chemistries normal  Cholesterol normal  Thyroid function normal  No anemia  
LMTCB.  Repeat A1C ordered.  
Left detailed message. Ok per HIPAA form. Advised to call with questions.    
Yes

## 2025-02-04 NOTE — ED ADULT NURSE NOTE - CAS DISCH CONDITION
Improved
gait, locomotion, and balance/gross motor/integumentary integrity/joint integrity and mobility/muscle strength/posture/ROM

## 2025-07-21 ENCOUNTER — EMERGENCY (EMERGENCY)
Facility: HOSPITAL | Age: 65
LOS: 1 days | End: 2025-07-21
Attending: EMERGENCY MEDICINE
Payer: COMMERCIAL

## 2025-07-21 VITALS
HEART RATE: 63 BPM | HEIGHT: 62 IN | DIASTOLIC BLOOD PRESSURE: 99 MMHG | OXYGEN SATURATION: 97 % | RESPIRATION RATE: 16 BRPM | WEIGHT: 184.97 LBS | TEMPERATURE: 98 F | SYSTOLIC BLOOD PRESSURE: 176 MMHG

## 2025-07-21 VITALS — WEIGHT: 188.5 LBS | HEART RATE: 72 BPM | OXYGEN SATURATION: 98 % | RESPIRATION RATE: 18 BRPM

## 2025-07-21 DIAGNOSIS — Z98.891 HISTORY OF UTERINE SCAR FROM PREVIOUS SURGERY: Chronic | ICD-10-CM

## 2025-07-21 DIAGNOSIS — Z90.49 ACQUIRED ABSENCE OF OTHER SPECIFIED PARTS OF DIGESTIVE TRACT: Chronic | ICD-10-CM

## 2025-07-21 LAB
ALBUMIN SERPL ELPH-MCNC: 4 G/DL — SIGNIFICANT CHANGE UP (ref 3.3–5)
ALP SERPL-CCNC: 104 U/L — SIGNIFICANT CHANGE UP (ref 40–120)
ALT FLD-CCNC: 7 U/L — LOW (ref 10–45)
ANION GAP SERPL CALC-SCNC: 12 MMOL/L — SIGNIFICANT CHANGE UP (ref 5–17)
APTT BLD: 27.4 SEC — SIGNIFICANT CHANGE UP (ref 26.1–36.8)
AST SERPL-CCNC: 10 U/L — SIGNIFICANT CHANGE UP (ref 10–40)
BASOPHILS # BLD AUTO: 0.1 K/UL — SIGNIFICANT CHANGE UP (ref 0–0.2)
BASOPHILS NFR BLD AUTO: 1.4 % — SIGNIFICANT CHANGE UP (ref 0–2)
BILIRUB SERPL-MCNC: 0.3 MG/DL — SIGNIFICANT CHANGE UP (ref 0.2–1.2)
BUN SERPL-MCNC: 21 MG/DL — SIGNIFICANT CHANGE UP (ref 7–23)
CALCIUM SERPL-MCNC: 9.2 MG/DL — SIGNIFICANT CHANGE UP (ref 8.4–10.5)
CHLORIDE SERPL-SCNC: 105 MMOL/L — SIGNIFICANT CHANGE UP (ref 96–108)
CO2 SERPL-SCNC: 24 MMOL/L — SIGNIFICANT CHANGE UP (ref 22–31)
CREAT SERPL-MCNC: 0.64 MG/DL — SIGNIFICANT CHANGE UP (ref 0.5–1.3)
EGFR: 99 ML/MIN/1.73M2 — SIGNIFICANT CHANGE UP
EGFR: 99 ML/MIN/1.73M2 — SIGNIFICANT CHANGE UP
EOSINOPHIL # BLD AUTO: 0.41 K/UL — SIGNIFICANT CHANGE UP (ref 0–0.5)
EOSINOPHIL NFR BLD AUTO: 5.9 % — SIGNIFICANT CHANGE UP (ref 0–6)
GLUCOSE SERPL-MCNC: 127 MG/DL — HIGH (ref 70–99)
HCT VFR BLD CALC: 40.9 % — SIGNIFICANT CHANGE UP (ref 34.5–45)
HGB BLD-MCNC: 13.1 G/DL — SIGNIFICANT CHANGE UP (ref 11.5–15.5)
IMM GRANULOCYTES # BLD AUTO: 0.03 K/UL — SIGNIFICANT CHANGE UP (ref 0–0.07)
IMM GRANULOCYTES NFR BLD AUTO: 0.4 % — SIGNIFICANT CHANGE UP (ref 0–0.9)
INR BLD: 0.95 RATIO — SIGNIFICANT CHANGE UP (ref 0.85–1.16)
LYMPHOCYTES # BLD AUTO: 1.76 K/UL — SIGNIFICANT CHANGE UP (ref 1–3.3)
LYMPHOCYTES NFR BLD AUTO: 25.2 % — SIGNIFICANT CHANGE UP (ref 13–44)
MCHC RBC-ENTMCNC: 26.4 PG — LOW (ref 27–34)
MCHC RBC-ENTMCNC: 32 G/DL — SIGNIFICANT CHANGE UP (ref 32–36)
MCV RBC AUTO: 82.3 FL — SIGNIFICANT CHANGE UP (ref 80–100)
MONOCYTES # BLD AUTO: 0.43 K/UL — SIGNIFICANT CHANGE UP (ref 0–0.9)
MONOCYTES NFR BLD AUTO: 6.2 % — SIGNIFICANT CHANGE UP (ref 2–14)
NEUTROPHILS # BLD AUTO: 4.26 K/UL — SIGNIFICANT CHANGE UP (ref 1.8–7.4)
NEUTROPHILS NFR BLD AUTO: 60.9 % — SIGNIFICANT CHANGE UP (ref 43–77)
NRBC # BLD AUTO: 0 K/UL — SIGNIFICANT CHANGE UP (ref 0–0)
NRBC # FLD: 0 K/UL — SIGNIFICANT CHANGE UP (ref 0–0)
NRBC BLD AUTO-RTO: 0 /100 WBCS — SIGNIFICANT CHANGE UP (ref 0–0)
PLATELET # BLD AUTO: 225 K/UL — SIGNIFICANT CHANGE UP (ref 150–400)
PMV BLD: 11.4 FL — SIGNIFICANT CHANGE UP (ref 7–13)
POTASSIUM SERPL-MCNC: 4.1 MMOL/L — SIGNIFICANT CHANGE UP (ref 3.5–5.3)
POTASSIUM SERPL-SCNC: 4.1 MMOL/L — SIGNIFICANT CHANGE UP (ref 3.5–5.3)
PROT SERPL-MCNC: 7.3 G/DL — SIGNIFICANT CHANGE UP (ref 6–8.3)
PROTHROM AB SERPL-ACNC: 10.9 SEC — SIGNIFICANT CHANGE UP (ref 9.9–13.4)
RBC # BLD: 4.97 M/UL — SIGNIFICANT CHANGE UP (ref 3.8–5.2)
RBC # FLD: 14.1 % — SIGNIFICANT CHANGE UP (ref 10.3–14.5)
SODIUM SERPL-SCNC: 141 MMOL/L — SIGNIFICANT CHANGE UP (ref 135–145)
TROPONIN T, HIGH SENSITIVITY RESULT: <6 NG/L — SIGNIFICANT CHANGE UP (ref 0–51)
WBC # BLD: 6.99 K/UL — SIGNIFICANT CHANGE UP (ref 3.8–10.5)
WBC # FLD AUTO: 6.99 K/UL — SIGNIFICANT CHANGE UP (ref 3.8–10.5)

## 2025-07-21 PROCEDURE — 80053 COMPREHEN METABOLIC PANEL: CPT

## 2025-07-21 PROCEDURE — 82435 ASSAY OF BLOOD CHLORIDE: CPT

## 2025-07-21 PROCEDURE — 70498 CT ANGIOGRAPHY NECK: CPT | Mod: 26

## 2025-07-21 PROCEDURE — 85730 THROMBOPLASTIN TIME PARTIAL: CPT

## 2025-07-21 PROCEDURE — 70496 CT ANGIOGRAPHY HEAD: CPT

## 2025-07-21 PROCEDURE — 85610 PROTHROMBIN TIME: CPT

## 2025-07-21 PROCEDURE — 99285 EMERGENCY DEPT VISIT HI MDM: CPT | Mod: 25

## 2025-07-21 PROCEDURE — 85025 COMPLETE CBC W/AUTO DIFF WBC: CPT

## 2025-07-21 PROCEDURE — 70450 CT HEAD/BRAIN W/O DYE: CPT | Mod: 26

## 2025-07-21 PROCEDURE — 85018 HEMOGLOBIN: CPT

## 2025-07-21 PROCEDURE — 82947 ASSAY GLUCOSE BLOOD QUANT: CPT

## 2025-07-21 PROCEDURE — 82803 BLOOD GASES ANY COMBINATION: CPT

## 2025-07-21 PROCEDURE — 96374 THER/PROPH/DIAG INJ IV PUSH: CPT | Mod: XU

## 2025-07-21 PROCEDURE — 82962 GLUCOSE BLOOD TEST: CPT

## 2025-07-21 PROCEDURE — 70496 CT ANGIOGRAPHY HEAD: CPT | Mod: 26

## 2025-07-21 PROCEDURE — 70498 CT ANGIOGRAPHY NECK: CPT

## 2025-07-21 PROCEDURE — 84295 ASSAY OF SERUM SODIUM: CPT

## 2025-07-21 PROCEDURE — 36415 COLL VENOUS BLD VENIPUNCTURE: CPT

## 2025-07-21 PROCEDURE — 85014 HEMATOCRIT: CPT

## 2025-07-21 PROCEDURE — 84484 ASSAY OF TROPONIN QUANT: CPT

## 2025-07-21 PROCEDURE — 82330 ASSAY OF CALCIUM: CPT

## 2025-07-21 PROCEDURE — 0042T: CPT

## 2025-07-21 PROCEDURE — 83605 ASSAY OF LACTIC ACID: CPT

## 2025-07-21 PROCEDURE — 93005 ELECTROCARDIOGRAM TRACING: CPT

## 2025-07-21 PROCEDURE — 93010 ELECTROCARDIOGRAM REPORT: CPT | Mod: 76

## 2025-07-21 PROCEDURE — 70450 CT HEAD/BRAIN W/O DYE: CPT

## 2025-07-21 PROCEDURE — 84132 ASSAY OF SERUM POTASSIUM: CPT

## 2025-07-21 PROCEDURE — 96375 TX/PRO/DX INJ NEW DRUG ADDON: CPT | Mod: XU

## 2025-07-21 PROCEDURE — 99285 EMERGENCY DEPT VISIT HI MDM: CPT

## 2025-07-21 RX ORDER — ONDANSETRON HCL/PF 4 MG/2 ML
4 VIAL (ML) INJECTION ONCE
Refills: 0 | Status: COMPLETED | OUTPATIENT
Start: 2025-07-21 | End: 2025-07-21

## 2025-07-21 RX ORDER — MECLIZINE HCL 12.5 MG
50 TABLET ORAL ONCE
Refills: 0 | Status: COMPLETED | OUTPATIENT
Start: 2025-07-21 | End: 2025-07-21

## 2025-07-21 RX ORDER — MECLIZINE HCL 12.5 MG
1 TABLET ORAL
Qty: 15 | Refills: 0
Start: 2025-07-21 | End: 2025-07-25

## 2025-07-21 RX ORDER — METOCLOPRAMIDE HCL 10 MG
10 TABLET ORAL ONCE
Refills: 0 | Status: COMPLETED | OUTPATIENT
Start: 2025-07-21 | End: 2025-07-21

## 2025-07-21 RX ADMIN — Medication 10 MILLIGRAM(S): at 07:54

## 2025-07-21 RX ADMIN — Medication 50 MILLIGRAM(S): at 07:53

## 2025-07-21 RX ADMIN — Medication 4 MILLIGRAM(S): at 07:39

## 2025-07-21 RX ADMIN — Medication 1000 MILLILITER(S): at 07:54

## 2025-07-21 NOTE — ED PROVIDER NOTE - PROGRESS NOTE DETAILS
JOSE ALFREDO Em: patient feeling improved ambulated to the bathroom with one person assist from son (uses cane at baseline). Patient now sitting in the chair eating breakfast. JOSE ALFREDO Em: neurology recommended discharge with outpatient f/u

## 2025-07-21 NOTE — ED PROVIDER NOTE - ATTENDING APP SHARED VISIT CONTRIBUTION OF CARE
PMD Kelley [place] pcp. Harvey Serra  65-year-old female past med history hypertension, Parkinson disease, asthma, vertigo, comes to ER complains of dizziness, onset 5 AM today.  Patient states his symptoms woke up from sleep, not similar to her prior vertigo, no room spinning, no headache fever chills chest pain short of breath palpitations.  Patient has had several episodes of nausea vomiting.  Physical exam adult female awake alert GCS 15 looking uncomfortable.  HEENT normocephalic atraumatic, neck supple  Chest scant end expiratory wheeze no use of  muscles, no respiratory stress.  CV no rubs gallops murmur.  Abdomen soft positive bowel sounds.  Neuro GCS 15 speech slow fluent moves all extremities, finger-nose slow, especially LUE with obvious parkinsonian tremor.  Sensation intact bilaterally.    Irwin Baptiste MD, Facep

## 2025-07-21 NOTE — ED PROVIDER NOTE - CLINICAL SUMMARY MEDICAL DECISION MAKING FREE TEXT BOX
Adult female past med history vertigo hypertension asthma Parkinson's disease presents with dizziness since 5 AM which awoke patient from sleep,  Associated with nausea vomiting.  Concerns for recurrent vertigo, central lesion, ACS.  Plan code stroke labs and consultation,  EKG/troponin, basic labs, reassess.  Irwin Baptiste MD, Facep

## 2025-07-21 NOTE — ED PROVIDER NOTE - NSFOLLOWUPINSTRUCTIONS_ED_ALL_ED_FT
98.9
Follow up with your Primary Care Physician within the next 2-3 days  Bring a copy of your test results with you to your appointment  Continue your current medication regimen  Return to the Emergency Room if you experience new or worsening symptoms abdominal pain, nausea, vomiting, fever chills, cough, chest pain, shortness of breath, dizziness, slurred speech, weakness, gait abnormality  Take meclizine every 8 hours as needed for dizziness  Follow up with ENT for vestibular rehab  Follow up with neurology [phone]

## 2025-07-21 NOTE — CONSULT NOTE ADULT - SUBJECTIVE AND OBJECTIVE BOX
Neurology - Consult Note    ---------------------------------  Spectra: [id number] ([facility]), [phone] ([facility])  ----------------------------------------------    HPI: BRITTNO ASTUDILLO is a 65y (1960) Female with a PMHx significant for Parkinson's, asthma, HTN, comes in w dizziness. LKW 2330 7/20. She woke up 7/21 0500 w sensation of severe nausea, and when she tried to stand up, she felt she couldn't walk and had to sit back down. Had some lightheadedness but denied room spinning sensation. But since then, every time she tries to stand up and walk or even keep her eyes open, she feels like she is about to fall, either side. A/w severe nausea, vomiting. No ear symptoms. Had vertigo about 2 months ago, room spinning, different to current episode in character and severity. Treated w ***. At baseline uses cane to walk, needs some assistance w daily activities like driving due to ***. Has previous hx of minor stroke 10years prior, unclear deficits. Has left sided tremors from Parkinson's, follows w Dr. Hale, is on sinemet  BID, pramipexole ER 0.375 daily. *** No recent Parkinson's or HTN med changes.    All other review of systems is negative unless indicated above.    PMHx/PSHx/Family Hx: As above, otherwise see below   No pertinent past medical history    Asthma    Parkinsons    HTN (hypertension)        Allergies:  No Known Allergies      Medications:  MEDICATIONS  (STANDING):  meclizine 50 milliGRAM(s) Oral Once  metoclopramide Injectable 10 milliGRAM(s) IV Push once  ondansetron Injectable 4 milliGRAM(s) IV Push once  sodium chloride 0.9% Bolus 1000 milliLiter(s) IV Bolus once    MEDICATIONS  (PRN):      --------------------------------------------------------------------------------------------------------------------------------------------------------------------------------------------------------------------    Vitals:  T(C): 36.6 (07-21-25 @ 07:11), Max: 36.6 (07-21-25 @ 07:11)  HR: 63 (07-21-25 @ 07:11) (63 - 63)  BP: 176/99 (07-21-25 @ 07:11) (176/99 - 176/99)  RR: 16 (07-21-25 @ 07:11) (16 - 16)  SpO2: 97% (07-21-25 @ 07:11) (97% - 97%)    PHYSICAL EXAM:     General - distress renan to dizziness    NEURO:    Mental status - Awake, Alert, Oriented to person, place, and time. Speech fluent, repetition and naming intact. Follows simple and complex commands.     Cranial nerves -   PERRL, BTT intact b/l, EOMI,   face sensation (V1-V3) intact b/l,   facial strength intact without asymmetry b/l,   hearing intact b/l,   palate with symmetric elevation,   trapezius 5/5 strength b/l,   tongue midline on protrusion with full lateral movement    HINTS  HI left head impulse saccade  N right beating nystagmus on right gaze, absent on left gaze  TS no skew    Motor - left sided cogwheel rigidity. No pronator drift.  Strength testing            Deltoid      Biceps      Triceps             R            5                 5               5                     5                                L             5                 5               5                     5                                            Hip Flexion    Hip Extension    Knee Flexion    Knee Extension    Dorsiflexion    Plantar Flexion  R              5                           5                       5                           5                            5                          5  L              5                           5                        5                           5                            5                          5    Sensation - Light touch intact throughout    DTR's -             Biceps      Triceps     Brachioradialis      Patellar    Ankle    Toes/plantar response  R             2+             2+                  2+                       2+            2+                 Down  L              2+             2+                 2+                        2+           2+                 Down    Coordination - Finger to Nose intact b/l. Heel to Samson intact b/l. resting tremors appreciated on left UE    Gait and station - LINDA due to distress    ---------------------------------------------------------------------------------------------------------------------------------------------------------------------------------------------------------------------------    Labs:                        13.1   6.99  )-----------( 225      ( 21 Jul 2025 07:25 )             40.9           CAPILLARY BLOOD GLUCOSE  143 (21 Jul 2025 07:36)      POCT Blood Glucose.: 143 mg/dL (21 Jul 2025 07:12)        PT/INR - ( 21 Jul 2025 07:25 )   PT: 10.9 sec;   INR: 0.95 ratio         PTT - ( 21 Jul 2025 07:25 )  PTT:27.4 sec  CSF:                  Radiology:     Neurology - Consult Note    ---------------------------------  Spectra: [id number] ([facility]), [phone] ([facility])  ----------------------------------------------    HPI: BRITTON ASTUDILLO is a 65y (1960) Female with a PMHx significant for Parkinson's, asthma, HTN, comes in w dizziness. LKW 2330 7/20. She woke up 7/21 0500 w sensation of severe nausea, and when she tried to stand up, she felt she couldn't walk and had to sit back down. Had some lightheadedness but denied room spinning sensation. But since then, every time she tries to stand up and walk or even keep her eyes open, she feels like she is about to fall, either side. A/w severe nausea, vomiting. No ear symptoms. No neck pain or manipulation. Had vertigo about 2 months ago, room spinning, different to current episode in character and severity. Treated wo medications. At baseline uses cane to walk, needs some assistance w daily activities like driving due to Parkinson's, chronic back pain. Has previous hx of minor stroke 10years prior, unclear deficits. Has left sided tremors from Parkinson's, follows w Dr. Hale, is on sinemet  BID, pramipexole ER 0.375 daily. No recent Parkinson's or HTN med changes. Non smoker. No AC/AP.    All other review of systems is negative unless indicated above.    PMHx/PSHx/Family Hx: As above, otherwise see below   No pertinent past medical history    Asthma    Parkinsons    HTN (hypertension)        Allergies:  No Known Allergies      Medications:  MEDICATIONS  (STANDING):  meclizine 50 milliGRAM(s) Oral Once  metoclopramide Injectable 10 milliGRAM(s) IV Push once  ondansetron Injectable 4 milliGRAM(s) IV Push once  sodium chloride 0.9% Bolus 1000 milliLiter(s) IV Bolus once    MEDICATIONS  (PRN):      --------------------------------------------------------------------------------------------------------------------------------------------------------------------------------------------------------------------    Vitals:  T(C): 36.6 (07-21-25 @ 07:11), Max: 36.6 (07-21-25 @ 07:11)  HR: 63 (07-21-25 @ 07:11) (63 - 63)  BP: 176/99 (07-21-25 @ 07:11) (176/99 - 176/99)  RR: 16 (07-21-25 @ 07:11) (16 - 16)  SpO2: 97% (07-21-25 @ 07:11) (97% - 97%)    PHYSICAL EXAM:     General - distress renan to dizziness    NEURO:    Mental status - Awake, Alert, Oriented to person, place, and time. Speech fluent, repetition and naming intact. Follows simple and complex commands.     Cranial nerves -   PERRL, BTT intact b/l, EOMI,   face sensation (V1-V3) intact b/l,   facial strength intact without asymmetry b/l,   hearing intact b/l,   palate with symmetric elevation,   trapezius 5/5 strength b/l,   tongue midline on protrusion with full lateral movement    HINTS  HI left head impulse saccade  N right beating nystagmus on right gaze, absent on left gaze  TS no skew    Motor - left sided cogwheel rigidity. No pronator drift.  Strength testing            Deltoid      Biceps      Triceps             R            5                 5               5                     5                                L             5                 5               5                     5                                            Hip Flexion    Hip Extension    Knee Flexion    Knee Extension    Dorsiflexion    Plantar Flexion  R              5                           5                       5                           5                            5                          5  L              5                           5                        5                           5                            5                          5    Sensation - Light touch intact throughout    DTR's -             Biceps      Triceps     Brachioradialis      Patellar    Ankle    Toes/plantar response  R             2+             2+                  2+                       2+            2+                 Down  L              2+             2+                 2+                        2+           2+                 Down    Coordination - Finger to Nose intact b/l. Heel to Samson intact b/l. resting tremors appreciated on left UE    Gait and station - LINDA due to distress    ---------------------------------------------------------------------------------------------------------------------------------------------------------------------------------------------------------------------------    Labs:                        13.1   6.99  )-----------( 225      ( 21 Jul 2025 07:25 )             40.9           CAPILLARY BLOOD GLUCOSE  143 (21 Jul 2025 07:36)      POCT Blood Glucose.: 143 mg/dL (21 Jul 2025 07:12)        PT/INR - ( 21 Jul 2025 07:25 )   PT: 10.9 sec;   INR: 0.95 ratio         PTT - ( 21 Jul 2025 07:25 )  PTT:27.4 sec  CSF:                  Radiology:     Neurology - Consult Note    ---------------------------------  Spectra: [id number] ([facility]), [id number] ([facility])  ----------------------------------------------    HPI: BRITTON ASTUDILLO is a 65y (1960) Female with a PMHx significant for Parkinson's, asthma, HTN, comes in w dizziness. LKW 2330 7/20. She woke up 7/21 0500 w sensation of severe nausea, and when she tried to stand up, she felt she couldn't walk and had to sit back down. Had some lightheadedness but denied room spinning sensation. But since then, every time she tries to stand up and walk or even keep her eyes open, she feels like she is about to fall, either side. A/w severe nausea, vomiting. No ear symptoms. No neck pain or manipulation. Had vertigo about 2 months ago, room spinning, different to current episode in character and severity. Treated wo medications. At baseline uses cane to walk, needs some assistance w daily activities like driving due to Parkinson's, chronic back pain. Has previous hx of minor stroke 10years prior, unclear deficits. Has left sided tremors from Parkinson's, follows w Dr. Hale, is on sinemet  BID, pramipexole ER 0.375 daily. No recent Parkinson's or HTN med changes. Non smoker. No AC/AP.    All other review of systems is negative unless indicated above.    PMHx/PSHx/Family Hx: As above, otherwise see below   No pertinent past medical history    Asthma    Parkinsons    HTN (hypertension)        Allergies:  No Known Allergies      Medications:  MEDICATIONS  (STANDING):  meclizine 50 milliGRAM(s) Oral Once  metoclopramide Injectable 10 milliGRAM(s) IV Push once  ondansetron Injectable 4 milliGRAM(s) IV Push once  sodium chloride 0.9% Bolus 1000 milliLiter(s) IV Bolus once    MEDICATIONS  (PRN):      --------------------------------------------------------------------------------------------------------------------------------------------------------------------------------------------------------------------    Vitals:  T(C): 36.6 (07-21-25 @ 07:11), Max: 36.6 (07-21-25 @ 07:11)  HR: 63 (07-21-25 @ 07:11) (63 - 63)  BP: 176/99 (07-21-25 @ 07:11) (176/99 - 176/99)  RR: 16 (07-21-25 @ 07:11) (16 - 16)  SpO2: 97% (07-21-25 @ 07:11) (97% - 97%)    PHYSICAL EXAM:     General - distress renan to dizziness    NEURO:    Mental status - Awake, Alert, Oriented to person, place, and time. Speech fluent, repetition and naming intact. Follows simple and complex commands.     Cranial nerves -   PERRL, BTT intact b/l, EOMI,   face sensation (V1-V3) intact b/l,   facial strength intact without asymmetry b/l,   hearing intact b/l,   palate with symmetric elevation,   trapezius 5/5 strength b/l,   tongue midline on protrusion with full lateral movement    HINTS  HI left head impulse saccade  N right beating nystagmus on right gaze, absent on left gaze  TS no skew    Motor - left sided cogwheel rigidity. No pronator drift.  Strength testing            Deltoid      Biceps      Triceps             R            5                 5               5                     5                                L             5                 5               5                     5                                            Hip Flexion    Hip Extension    Knee Flexion    Knee Extension    Dorsiflexion    Plantar Flexion  R              5                           5                       5                           5                            5                          5  L              5                           5                        5                           5                            5                          5    Sensation - Light touch intact throughout    DTR's -             Biceps      Triceps     Brachioradialis      Patellar    Ankle    Toes/plantar response  R             2+             2+                  2+                       2+            2+                 Down  L              2+             2+                 2+                        2+           2+                 Down    Coordination - Finger to Nose intact b/l. Heel to Samson intact b/l. resting tremors appreciated on left UE    Gait and station - LINDA due to distress    ---------------------------------------------------------------------------------------------------------------------------------------------------------------------------------------------------------------------------    Labs:                        13.1   6.99  )-----------( 225      ( 21 Jul 2025 07:25 )             40.9           CAPILLARY BLOOD GLUCOSE  143 (21 Jul 2025 07:36)      POCT Blood Glucose.: 143 mg/dL (21 Jul 2025 07:12)        PT/INR - ( 21 Jul 2025 07:25 )   PT: 10.9 sec;   INR: 0.95 ratio         PTT - ( 21 Jul 2025 07:25 )  PTT:27.4 sec  CSF:                  Radiology:    CT PERFUSION:  No perfusion abnormality is noted.    Technical limitations: None.    Core infarction: 0 ml  Penumbra / tissue at risk for active ischemia: 0 ml    CTA NECK:  No evidence of significant stenosis or occlusion.    CTA HEAD:  No large vessel occlusion, significant stenosis or vascular abnormality   identified.

## 2025-07-21 NOTE — ED ADULT NURSE NOTE - NSFALLUNIVINTERV_ED_ALL_ED
Bed/Stretcher in lowest position, wheels locked, appropriate side rails in place/Call bell, personal items and telephone in reach/Instruct patient to call for assistance before getting out of bed/chair/stretcher/Non-slip footwear applied when patient is off stretcher/[place] to call system/Physically safe environment - no spills, clutter or unnecessary equipment/Purposeful proactive rounding/Room/bathroom lighting operational, light cord in reach

## 2025-07-21 NOTE — ED ADULT NURSE NOTE - OBJECTIVE STATEMENT
Pt is a 65y F A&O4 PMHx Parkinsons with L sided weakness, HTN, vertigo presenting to the ED with dizziness, nausea, vomiting. Pt went to sleep last night at 1130 PM with no symptoms. Pt woke up at 5 AM this morning with dizziness. Pt reports she was unable to walk from the dizziness. Pt denies shortness of breath, chest pain, diarrhea. Pt vomiting in CT. Pt given zofran in CT per MD Rosas. Code stroke called in ED. Pt ambulates with cane at baseline. Pt placed on CM in NSR. placed in gown, side rails up for safety, bed in lowest position, call bell within reach, patient and family educated on plan of care, comfort and safety provided.

## 2025-07-21 NOTE — ED PROVIDER NOTE - PATIENT PORTAL LINK FT
You can access the FollowMyHealth Patient Portal offered by [facility] by registering at the following website: [email or web address]. By joining [employer]’s FollowMyHealth portal, you will also be able to view your health information using other applications (apps) compatible with our system.

## 2025-07-21 NOTE — ED PROVIDER NOTE - NEUROLOGICAL MOTOR
Vital Signs Last 24 Hrs  T(C): 37 (21 Nov 2020 22:27), Max: 37.1 (21 Nov 2020 14:50)  T(F): 98.6 (21 Nov 2020 22:27), Max: 98.7 (21 Nov 2020 14:50)  HR: 70 (21 Nov 2020 22:27) (68 - 74)  BP: 117/61 (21 Nov 2020 22:27) (116/69 - 121/72)  BP(mean): --  RR: 16 (21 Nov 2020 22:27) (16 - 18)  SpO2: 98% (21 Nov 2020 22:27) (98% - 100%)    GENERAL: NAD, speaks in full sentences, no signs of respiratory distress  HEAD:  Atraumatic, Normocephalic  EYES: EOMI, PERRLA, conjunctiva and sclera clear  NECK: Supple, No JVD  CHEST/LUNG: Clear to auscultation bilaterally; No wheeze; No crackles; No accessory muscles used  HEART: Regular rate and rhythm; No murmurs;   ABDOMEN: Soft, Nontender, Nondistended; Bowel sounds present; No guarding, LLQ tenderness +ve  EXTREMITIES:  2+ Peripheral Pulses, No cyanosis or edema  PSYCH:  Normal Affect  NEUROLOGY: non-focal, AAO X 3. Strength is 5/5. no sensory loss.  SKIN: No rashes or lesions Vital Signs Last 24 Hrs  T(C): 37 (21 Nov 2020 22:27), Max: 37.1 (21 Nov 2020 14:50)  T(F): 98.6 (21 Nov 2020 22:27), Max: 98.7 (21 Nov 2020 14:50)  HR: 70 (21 Nov 2020 22:27) (68 - 74)  BP: 117/61 (21 Nov 2020 22:27) (116/69 - 121/72)  RR: 16 (21 Nov 2020 22:27) (16 - 18)  SpO2: 98% (21 Nov 2020 22:27) (98% - 100%)    GENERAL: NAD, speaks in full sentences, no signs of respiratory distress  HEAD:  Atraumatic, Normocephalic  EYES: EOMI, PERRL, conjunctiva and sclera clear  NECK: Supple, No JVD  CHEST/LUNG: Clear to auscultation bilaterally; No wheeze; No crackles; No accessory muscles used  HEART: Regular rate and rhythm; No murmurs;   ABDOMEN: Soft, Nontender, Nondistended; Bowel sounds present; No guarding, LLQ tenderness +ve  EXTREMITIES:  2+ Peripheral Pulses, No cyanosis or edema  PSYCH:  Normal Affect  NEUROLOGY: non-focal, AAO X 3. Strength is 5/5. no sensory loss.  SKIN: No rashes or lesions normal

## 2025-07-21 NOTE — ED PROVIDER NOTE - OBJECTIVE STATEMENT
65-year-old female with past medical history of Parkinson's with known left-sided tremor/weakness to the left upper extremity, Hypertension non-smoker not presenting to the ED with dizziness since 5 AM unable to ambulate.  Patient feels like she is going to fall down if she walks.  Patient vomited prior to ED arrival.  Patient has had vertigo in the past but this feels different.  Patient denies slurred speech headache gait abnormality visual disturbances previous stroke

## 2025-07-21 NOTE — CONSULT NOTE ADULT - ASSESSMENT
Assessment:    Impression:    Recs:   Assessment:  BRITTON ASTUDILLO is a 65y (1960) Female with a PMHx significant for Parkinson's, asthma, HTN, comes in w dizziness. LKW 2330 7/20. She woke up 7/21 0500 w sensation of severe nausea, and when she tried to stand up, she felt she couldn't walk and had to sit back down. Had some lightheadedness but denied room spinning sensation. But since then, every time she tries to stand up and walk or even keep her eyes open, she feels like she is about to fall, either side. A/w severe nausea, vomiting. No ear symptoms. No neck pain or manipulation. She did have outside food on 7/20 but denies any abdominal pain or other GI symptoms. Had vertigo about 2 months ago, room spinning, different to current episode in character and severity. Treated wo medications. At baseline uses cane to walk, needs some assistance w daily activities like driving due to Parkinson's, chronic back pain. Has previous hx of minor stroke 10years prior, unclear deficits. Has left sided tremors from Parkinson's, follows w Dr. Hale, is on sinemet  BID, pramipexole ER 0.375 daily. No recent Parkinson's or HTN med changes. Non smoker. No AC/AP. On exam, left UE< LE cogwheel rigidity and tremors. Otherwise, no focal CN, motor or sensory or coordination deficits. CT/CTA appears unremarkable, pending final read    HINTS  HI left head impulse saccade  N right beating nystagmus on right gaze, absent on left gaze  TS no skew    LKW 2330 7/20  MRS 0  NIHSS 0, dizziness  out of window for tenecteplase  not a thrombectomy candidate as no LVO    Impression: Positional imbalance w positive HINTS exam likely peripheral vertigo. Low suspicion of stroke.     Recs:  [] Meclizine 12.5mg BID PRN (give one now)  [] Reglan 4mg  PRN Q8H  [] Refer for Vestibular Rehab on discharge  [] ENT eval on discharge  [] Patient can follow up with Dr. Moisés Arnold at [address] [facility] ([phone]) OR general neurology at [address] [place] [address] ([phone]) 1-2 weeks after discharge. Please instruct the patient to call the respective numbers to schedule this appointment.      Case discussed w Dr. Duckworth under the supervision of Dr. Jones. Case to be discussed w Dr. Znuiga Assessment:  BRITTON ASTUDILLO is a 65y (1960) Female with a PMHx significant for Parkinson's, asthma, HTN, comes in w dizziness. LKW 2330 7/20. She woke up 7/21 0500 w sensation of severe nausea, and when she tried to stand up, she felt she couldn't walk and had to sit back down. Had some lightheadedness but denied room spinning sensation. But since then, every time she tries to stand up and walk or even keep her eyes open, she feels like she is about to fall, either side. A/w severe nausea, vomiting. No ear symptoms. No neck pain or manipulation. She did have outside food on 7/20 but denies any abdominal pain or other GI symptoms. Had vertigo about 2 months ago, room spinning, different to current episode in character and severity. Treated wo medications. At baseline uses cane to walk, needs some assistance w daily activities like driving due to Parkinson's, chronic back pain. Has previous hx of minor stroke 10years prior, unclear deficits. Has left sided tremors from Parkinson's, follows w Dr. Hale, is on sinemet  BID, pramipexole ER 0.375 daily. No recent Parkinson's or HTN med changes. Non smoker. No AC/AP. On exam, left UE< LE cogwheel rigidity and tremors. Otherwise, no focal CN, motor or sensory or coordination deficits. CT/CTA appears unremarkable, pending final read    HINTS  HI left head impulse saccade  N right beating nystagmus on right gaze, absent on left gaze  TS no skew    LKW 2330 7/20  MRS 0  NIHSS 0, dizziness  out of window for tenecteplase  not a thrombectomy candidate as no LVO    Impression: Positional imbalance w positive HINTS exam likely peripheral vertigo. Low suspicion of stroke.     Recs:  [] orthostatic vitals  [] Meclizine 12.5mg BID PRN (give one now)  [] Reglan 4mg  PRN Q8H  [] Refer for Vestibular Rehab on discharge  [] ENT eval on discharge  [] Patient can follow up with outpatient neurologist Dr. Hale      Case discussed w Dr. Duckworth under the supervision of Dr. Jones. Case discussed w Dr. Zuniga Assessment:  BRITTON ASTUDILLO is a 65y (1960) Female with a PMHx significant for Parkinson's, asthma, HTN, comes in w dizziness. LKW 2330 7/20. She woke up 7/21 0500 w sensation of severe nausea, and when she tried to stand up, she felt she couldn't walk and had to sit back down. Had some lightheadedness but denied room spinning sensation. But since then, every time she tries to stand up and walk or even keep her eyes open, she feels like she is about to fall, either side. A/w severe nausea, vomiting. No ear symptoms. No neck pain or manipulation. She did have outside food on 7/20 but denies any abdominal pain or other GI symptoms. Had vertigo about 2 months ago, room spinning, different to current episode in character and severity. Treated wo medications. At baseline uses cane to walk, needs some assistance w daily activities like driving due to Parkinson's, chronic back pain. Has previous hx of minor stroke 10years prior, unclear deficits. Has left sided tremors from Parkinson's, follows w Dr. Hale, is on sinemet  BID, pramipexole ER 0.375 daily. No recent Parkinson's or HTN med changes. Non smoker. No AC/AP. On exam, left UE< LE cogwheel rigidity and tremors. Otherwise, no focal CN, motor or sensory or coordination deficits. CT/CTA appears unremarkable, pending final read    HINTS  HI left head impulse saccade  N right beating nystagmus on right gaze, absent on left gaze  TS no skew    LKW 2330 7/20  MRS 0  NIHSS 0, dizziness  out of window for tenecteplase  not a thrombectomy candidate as no LVO    Impression: Positional imbalance w positive HINTS exam likely peripheral vertigo. Low suspicion of stroke.     Recs:  [] orthostatic vitals  [] Meclizine 12.5mg BID PRN (give one now)  [] Reglan 4mg  PRN Q8H  [] Refer for Vestibular Rehab on discharge  [] ENT eval on discharge  [] no further inpatient neurological workup  [] Patient can follow up with outpatient neurologist Dr. Hale      Case discussed w Dr. Duckworth under the supervision of Dr. Jones. Case discussed w Dr. Zuniga

## 2025-07-28 ENCOUNTER — NON-APPOINTMENT (OUTPATIENT)
Age: 65
End: 2025-07-28

## 2025-07-29 ENCOUNTER — APPOINTMENT (OUTPATIENT)
Dept: GASTROENTEROLOGY | Facility: CLINIC | Age: 65
End: 2025-07-29
Payer: COMMERCIAL

## 2025-07-29 VITALS
HEART RATE: 77 BPM | DIASTOLIC BLOOD PRESSURE: 83 MMHG | WEIGHT: 184 LBS | TEMPERATURE: 98.7 F | OXYGEN SATURATION: 95 % | HEIGHT: 62 IN | SYSTOLIC BLOOD PRESSURE: 122 MMHG | BODY MASS INDEX: 33.86 KG/M2

## 2025-07-29 DIAGNOSIS — K59.09 OTHER CONSTIPATION: ICD-10-CM

## 2025-07-29 DIAGNOSIS — Z63.5 DISRUPTION OF FAMILY BY SEPARATION AND DIVORCE: ICD-10-CM

## 2025-07-29 DIAGNOSIS — G20.A1 PARKINSON'S DISEASE WITHOUT DYSKINESIA, WITHOUT MENTION OF FLUCTUATIONS: ICD-10-CM

## 2025-07-29 DIAGNOSIS — R14.0 ABDOMINAL DISTENSION (GASEOUS): ICD-10-CM

## 2025-07-29 DIAGNOSIS — K57.32 DIVERTICULITIS OF LARGE INTESTINE W/OUT PERFORATION OR ABSCESS W/OUT BLEEDING: ICD-10-CM

## 2025-07-29 PROCEDURE — 99214 OFFICE O/P EST MOD 30 MIN: CPT

## 2025-07-29 PROCEDURE — G2211 COMPLEX E/M VISIT ADD ON: CPT | Mod: NC

## 2025-07-29 RX ORDER — MONTELUKAST SODIUM 10 MG/1
10 TABLET, FILM COATED ORAL
Refills: 0 | Status: ACTIVE | COMMUNITY

## 2025-07-29 RX ORDER — CARBIDOPA AND LEVODOPA 50; 200 MG/1; MG/1
50-200 TABLET, EXTENDED RELEASE ORAL
Refills: 0 | Status: ACTIVE | COMMUNITY

## 2025-07-29 RX ORDER — PROPRANOLOL HYDROCHLORIDE 10 MG/1
10 TABLET ORAL
Refills: 0 | Status: ACTIVE | COMMUNITY

## 2025-07-29 SDOH — SOCIAL STABILITY - SOCIAL INSECURITY: DISRUPTION OF FAMILY BY SEPARATION AND DIVORCE: Z63.5
